# Patient Record
Sex: FEMALE | Race: WHITE | NOT HISPANIC OR LATINO | Employment: OTHER | URBAN - METROPOLITAN AREA
[De-identification: names, ages, dates, MRNs, and addresses within clinical notes are randomized per-mention and may not be internally consistent; named-entity substitution may affect disease eponyms.]

---

## 2018-11-26 ENCOUNTER — OFFICE VISIT (OUTPATIENT)
Dept: INTERNAL MEDICINE | Facility: MEDICAL CENTER | Age: 83
End: 2018-11-26
Payer: MEDICARE

## 2018-11-26 VITALS
WEIGHT: 94 LBS | HEART RATE: 62 BPM | DIASTOLIC BLOOD PRESSURE: 89 MMHG | HEIGHT: 57 IN | TEMPERATURE: 97.8 F | BODY MASS INDEX: 20.28 KG/M2 | OXYGEN SATURATION: 93 % | SYSTOLIC BLOOD PRESSURE: 153 MMHG | RESPIRATION RATE: 16 BRPM

## 2018-11-26 DIAGNOSIS — F41.9 ANXIETY: ICD-10-CM

## 2018-11-26 DIAGNOSIS — R25.1 TREMOR: ICD-10-CM

## 2018-11-26 DIAGNOSIS — R41.3 MEMORY LOSS: ICD-10-CM

## 2018-11-26 DIAGNOSIS — R26.81 UNSTEADY GAIT: ICD-10-CM

## 2018-11-26 DIAGNOSIS — F32.A DEPRESSION, UNSPECIFIED DEPRESSION TYPE: ICD-10-CM

## 2018-11-26 DIAGNOSIS — K27.9 PEPTIC ULCER: ICD-10-CM

## 2018-11-26 DIAGNOSIS — R94.31 PROLONGED Q-T INTERVAL ON ECG: ICD-10-CM

## 2018-11-26 PROCEDURE — 99204 OFFICE O/P NEW MOD 45 MIN: CPT | Performed by: INTERNAL MEDICINE

## 2018-11-26 RX ORDER — PANTOPRAZOLE SODIUM 40 MG/1
40 FOR SUSPENSION ORAL DAILY
COMMUNITY
End: 2018-11-26 | Stop reason: SDUPTHER

## 2018-11-26 RX ORDER — ACETAMINOPHEN 325 MG/1
650 TABLET ORAL EVERY 4 HOURS PRN
COMMUNITY

## 2018-11-26 RX ORDER — PANTOPRAZOLE SODIUM 40 MG/1
40 FOR SUSPENSION ORAL DAILY
Qty: 90 EACH | Refills: 3 | Status: SHIPPED | OUTPATIENT
Start: 2018-11-26

## 2018-11-26 RX ORDER — DOCUSATE SODIUM 100 MG/1
100 CAPSULE, LIQUID FILLED ORAL DAILY
COMMUNITY

## 2018-11-26 RX ORDER — CITALOPRAM HYDROBROMIDE 10 MG/1
10 TABLET ORAL DAILY
COMMUNITY
End: 2018-12-06 | Stop reason: SDUPTHER

## 2018-11-26 ASSESSMENT — PATIENT HEALTH QUESTIONNAIRE - PHQ9
CLINICAL INTERPRETATION OF PHQ2 SCORE: 3
5. POOR APPETITE OR OVEREATING: 0 - NOT AT ALL
SUM OF ALL RESPONSES TO PHQ QUESTIONS 1-9: 11

## 2018-11-26 ASSESSMENT — PAIN SCALES - GENERAL: PAINLEVEL: 2=MINIMAL-SLIGHT

## 2018-11-26 NOTE — PROGRESS NOTES
"      Established Patient    Cathy presents today with the following:    CC: ***    HPI: ***    Patient Active Problem List    Diagnosis Date Noted   • Peptic ulcer 11/26/2018   • Memory loss 11/26/2018   • Anxiety 11/26/2018   • Depression 11/26/2018   • Tremor 11/26/2018       Current Outpatient Prescriptions   Medication Sig Dispense Refill   • acetaminophen (TYLENOL) 325 MG Tab Take 650 mg by mouth every four hours as needed.     • Multiple Vitamins-Iron (MULTIVITAMIN/IRON PO) Take  by mouth.     • docusate sodium (STOOL SOFTENER) 100 MG Cap Take 100 mg by mouth 2 times a day.     • citalopram (CELEXA) 10 MG tablet Take 10 mg by mouth every day.     • pantoprazole (PROTONIX) 40 MG Pack 40 mg by Nasogastric route every day.       No current facility-administered medications for this visit.        ROS: As per HPI. Additional pertinent symptoms as noted below.    {ROS List:[id number]}    /89 (BP Location: Right arm, Patient Position: Sitting, BP Cuff Size: Adult)   Pulse 62   Temp 36.6 °C (97.8 °F) (Temporal)   Resp 16   Ht 1.448 m (4' 9\")   Wt 42.6 kg (94 lb)   SpO2 93%   Breastfeeding? No   BMI 20.34 kg/m²     Physical Exam   Constitutional:  oriented to person, place, and time. No distress.   Eyes: Pupils are equal, round, and reactive to light. No scleral icterus.  Neck: Neck supple. No thyromegaly present.   Cardiovascular: Normal rate, regular rhythm and normal heart sounds.  Exam reveals no gallop and no friction rub.  No murmur heard.  Pulmonary/Chest: Breath sounds normal. Chest wall is not dull to percussion.   Musculoskeletal:   no edema.   Lymphadenopathy: no cervical adenopathy  Neurological: alert and oriented to person, place, and time.   Skin: No cyanosis. Nails show no clubbing.  Other: ***    Note: I have reviewed all pertinent labs and diagnostic tests associated with this visit with specific comments listed under the assessment and plan below    Assessment and Plan    1. Peptic " ulcer  ***    2. Memory loss  ***    3. Anxiety  ***    4. Depression, unspecified depression type  ***    5. Tremor  ***    No recent , she has mild unsteady gait.   Get up and go: she could get up with out using her hands but she was walking unsteady. Will refer to PT.   Depression on Citalopram, old EKG on 9/21/18 from out side shows QT of 508. Will repeat EKG today. PHQ 9 is 11. No SI.  MINI cog is normal.  Daughter helps with bathing.  She is dependent in IADL like shopping , cooking.   She declines mammogram.  Colonoscopy 20 years ago.she declines another one.  She received her Flu shot     Followup: No Follow-up on file.      Signed by: Eulalia Tinoco M.D.

## 2018-11-26 NOTE — PROGRESS NOTES
New Patient to Establish    Reason to establish: New patient to establish    CC: patient is her today to get established    HPI: 83 year old lady with PMH of Peptic ulcer disease, tremor, depression, anxiety is here to get established.    Peptic ulcer disease with GI bleeding in August 2018. She is on pantoprazole and states that she has no abdominal pian, however once ximena while had dark brown ? Stool.    Depression and anxiety on Celexa, no SI, no HI.    Memory loss, she was told years ago that she has Alzheimer's disease.     Tremor, in hands which started around August 2018.    Patient Active Problem List    Diagnosis Date Noted   • Peptic ulcer 11/26/2018   • Memory loss 11/26/2018   • Anxiety 11/26/2018   • Depression 11/26/2018   • Tremor 11/26/2018       Past Medical History:   Diagnosis Date   • Anxiety    • Depression    • Memory deficit    • Peptic ulcer    • Tremor        Current Outpatient Prescriptions   Medication Sig Dispense Refill   • acetaminophen (TYLENOL) 325 MG Tab Take 650 mg by mouth every four hours as needed.     • Multiple Vitamins-Iron (MULTIVITAMIN/IRON PO) Take  by mouth.     • docusate sodium (STOOL SOFTENER) 100 MG Cap Take 100 mg by mouth 2 times a day.     • citalopram (CELEXA) 10 MG tablet Take 10 mg by mouth every day.     • pantoprazole (PROTONIX) 40 MG Pack 40 mg by Nasogastric route every day. 90 Each 3     No current facility-administered medications for this visit.        Allergies as of 11/26/2018   • (No Known Allergies)       Social History     Social History   • Marital status: Single     Spouse name: N/A   • Number of children: N/A   • Years of education: N/A     Occupational History   • Not on file.     Social History Main Topics   • Smoking status: Never Smoker   • Smokeless tobacco: Never Used   • Alcohol use No      Comment: periodically   • Drug use: No   • Sexual activity: Not on file     Other Topics Concern   • Not on file     Social History Narrative   • No  "narrative on file       Family History   Problem Relation Age of Onset   • Heart Disease Mother    • Alzheimer's Disease Father    • Heart Disease Father    • Heart Disease Brother        Past Surgical History:   Procedure Laterality Date   • APPENDECTOMY     • CHOLECYSTECTOMY     • HYSTERECTOMY, TOTAL ABDOMINAL         ROS: As per HPI. Additional pertinent symptoms as noted below.    Constitutional: no fever,no chills, no weight gain or loss.  Eyes: no blured vision, no floaters  ENT: no sore throat, no ear pain  Cardiovascular: no CP, no palpitation  Respiratory: no SOB, no cough  GI: no abdominal pain, has chronci constipation and intermittent dark brown stools.  : no dysuria, no freqency  Musculo-skeletal: no myalgia, has some back pain with walking,   Neurological: no numbness or weakness.has tremor over both hands    /89 (BP Location: Right arm, Patient Position: Sitting, BP Cuff Size: Adult)   Pulse 62   Temp 36.6 °C (97.8 °F) (Temporal)   Resp 16   Ht 1.448 m (4' 9\")   Wt 42.6 kg (94 lb)   SpO2 93%   Breastfeeding? No   BMI 20.34 kg/m²     Physical Exam  General:  Alert and oriented, No apparent distress.    Eyes: Pupils equal and reactive. No scleral icterus.    Throat: Clear no erythema or exudates noted.    Neck: Supple. No lymphadenopathy noted. Thyroid not enlarged.    Lungs: Clear to auscultation and percussion bilaterally.    Cardiovascular: Regular rate and rhythm. No murmurs, rubs or gallops.    Abdomen:  Benign. No rebound or guarding noted.    Extremities: No clubbing, cyanosis, edema.    Skin: Clear. No rash or suspicious skin lesions noted.        No te: I have reviewed all pertinent labs and diagnostic tests associated with this visit with specific comments listed under the assessment and plan below    Assessment and Plan    1. Peptic ulcer  with GI bleeding in August 2018. She is on pantoprazole and states that she has no abdominal pian, however once ximena while had dark brown ? " Stool.    Will continue pantoprazole. Ordered FOBT and asked her to turn in stool samples when they are dark brown.    2. Memory loss  MINI COG was normal (she recalled 3/3 words) , I assured her that she does not have dementia.    3. Anxiety  4. Depression, unspecified depression type  She is on Celxa, PHQ9 is 11. EKG was done and showed Qt of 470. She has had QT intervals up to 508 on 9/21/18 (out side records). I discussed it with her and her daughter that continuation of Celexa can cause irregular heart rhythm that can be life threatening. However they chose to continue Celexa and accept all the risks.     5. Tremor  Will check TSH.    6. Unsteady gait  Referred to PT.    7. Prolonged Q-T interval on ECG  Please look at #4 for more details.    EKG also shows possible J point elevation over anterior leads and some T wave in versions over other leads, However comparing to an EKG from 2015 from her old records showed that there is not much changes, She and her daughter declined any further cardiac work up and declined to go to ER. (She has no chest pains).    8- Geriatrics assessment and health maintenance.    No recent falls. Get up and go: she could get up without using her hands but she was walking unsteady. Will refer to PT.   MINI cog is normal.  Daughter helps with bathing.she is independent in other ADLs.  She is dependent in IADL like shopping , cooking.   She declines mammogram.  Colonoscopy 20 years ago.she declines another one.  She received her Flu shot   She will bring a copy of her vaccination records for us next time.    CBC, CMP. TSH, FOBT ordered.    Followup: Return in about 1 month (around 12/26/2018).      Signed by: Eulalia Tinoco M.D.

## 2018-12-05 ENCOUNTER — TELEPHONE (OUTPATIENT)
Dept: INTERNAL MEDICINE | Facility: MEDICAL CENTER | Age: 83
End: 2018-12-05

## 2018-12-05 DIAGNOSIS — F32.A DEPRESSION, UNSPECIFIED DEPRESSION TYPE: ICD-10-CM

## 2018-12-06 ENCOUNTER — TELEPHONE (OUTPATIENT)
Dept: INTERNAL MEDICINE | Facility: MEDICAL CENTER | Age: 83
End: 2018-12-06

## 2018-12-06 RX ORDER — CITALOPRAM HYDROBROMIDE 10 MG/1
10 TABLET ORAL DAILY
Qty: 30 TAB | Refills: 3 | Status: SHIPPED | OUTPATIENT
Start: 2018-12-06 | End: 2019-03-31 | Stop reason: SDUPTHER

## 2018-12-06 NOTE — TELEPHONE ENCOUNTER
VOICEMAIL  1. Caller Name: Sarah                      Call Back Number: [phone]    2. Message: Sarah called stating in last visit there were two scripts that were asked to be refilled but only the pantoprazole was refilled. She would like to know if she could get a refill on her mother's hydrobromide.    3. Patient approves office to leave a detailed voicemail/MyChart message: N\A

## 2018-12-06 NOTE — TELEPHONE ENCOUNTER
I spoke with her daughter and she stated that her mom needs a renewal of  citalopram hydrobromide (Celexa). She again confirmed that she and her mom would like to accept the cardiac risks and would like to continue Celexa.

## 2018-12-11 ENCOUNTER — HOSPITAL ENCOUNTER (OUTPATIENT)
Dept: LAB | Facility: MEDICAL CENTER | Age: 83
End: 2018-12-11
Attending: INTERNAL MEDICINE
Payer: MEDICARE

## 2018-12-11 DIAGNOSIS — R25.1 TREMOR: ICD-10-CM

## 2018-12-11 DIAGNOSIS — K27.9 PEPTIC ULCER: ICD-10-CM

## 2018-12-11 LAB
ALBUMIN SERPL BCP-MCNC: 4.3 G/DL (ref 3.2–4.9)
ALBUMIN/GLOB SERPL: 1.4 G/DL
ALP SERPL-CCNC: 55 U/L (ref 30–99)
ALT SERPL-CCNC: 25 U/L (ref 2–50)
ANION GAP SERPL CALC-SCNC: 9 MMOL/L (ref 0–11.9)
ANISOCYTOSIS BLD QL SMEAR: ABNORMAL
AST SERPL-CCNC: 29 U/L (ref 12–45)
BASOPHILS # BLD AUTO: 0.5 % (ref 0–1.8)
BASOPHILS # BLD: 0.04 K/UL (ref 0–0.12)
BILIRUB SERPL-MCNC: 0.3 MG/DL (ref 0.1–1.5)
BUN SERPL-MCNC: 18 MG/DL (ref 8–22)
CALCIUM SERPL-MCNC: 9.7 MG/DL (ref 8.5–10.5)
CHLORIDE SERPL-SCNC: 103 MMOL/L (ref 96–112)
CO2 SERPL-SCNC: 27 MMOL/L (ref 20–33)
COMMENT 1642: NORMAL
CREAT SERPL-MCNC: 0.87 MG/DL (ref 0.5–1.4)
EOSINOPHIL # BLD AUTO: 0.15 K/UL (ref 0–0.51)
EOSINOPHIL NFR BLD: 1.9 % (ref 0–6.9)
ERYTHROCYTE [DISTWIDTH] IN BLOOD BY AUTOMATED COUNT: 68.1 FL (ref 35.9–50)
GLOBULIN SER CALC-MCNC: 3.1 G/DL (ref 1.9–3.5)
GLUCOSE SERPL-MCNC: 66 MG/DL (ref 65–99)
HCT VFR BLD AUTO: 40.3 % (ref 37–47)
HGB BLD-MCNC: 13.2 G/DL (ref 12–16)
IMM GRANULOCYTES # BLD AUTO: 0.01 K/UL (ref 0–0.11)
IMM GRANULOCYTES NFR BLD AUTO: 0.1 % (ref 0–0.9)
LYMPHOCYTES # BLD AUTO: 2.62 K/UL (ref 1–4.8)
LYMPHOCYTES NFR BLD: 34 % (ref 22–41)
MACROCYTES BLD QL SMEAR: ABNORMAL
MCH RBC QN AUTO: 33.4 PG (ref 27–33)
MCHC RBC AUTO-ENTMCNC: 32.8 G/DL (ref 33.6–35)
MCV RBC AUTO: 102 FL (ref 81.4–97.8)
MONOCYTES # BLD AUTO: 0.57 K/UL (ref 0–0.85)
MONOCYTES NFR BLD AUTO: 7.4 % (ref 0–13.4)
MORPHOLOGY BLD-IMP: NORMAL
NEUTROPHILS # BLD AUTO: 4.32 K/UL (ref 2–7.15)
NEUTROPHILS NFR BLD: 56.1 % (ref 44–72)
NRBC # BLD AUTO: 0 K/UL
NRBC BLD-RTO: 0 /100 WBC
OVALOCYTES BLD QL SMEAR: NORMAL
PLATELET # BLD AUTO: 280 K/UL (ref 164–446)
PLATELET BLD QL SMEAR: NORMAL
PMV BLD AUTO: 9.3 FL (ref 9–12.9)
POIKILOCYTOSIS BLD QL SMEAR: NORMAL
POLYCHROMASIA BLD QL SMEAR: NORMAL
POTASSIUM SERPL-SCNC: 3.8 MMOL/L (ref 3.6–5.5)
PROT SERPL-MCNC: 7.4 G/DL (ref 6–8.2)
RBC # BLD AUTO: 3.95 M/UL (ref 4.2–5.4)
RBC BLD AUTO: PRESENT
SODIUM SERPL-SCNC: 139 MMOL/L (ref 135–145)
TSH SERPL DL<=0.005 MIU/L-ACNC: 4.2 UIU/ML (ref 0.38–5.33)
WBC # BLD AUTO: 7.7 K/UL (ref 4.8–10.8)

## 2018-12-11 PROCEDURE — 36415 COLL VENOUS BLD VENIPUNCTURE: CPT

## 2018-12-11 PROCEDURE — 80053 COMPREHEN METABOLIC PANEL: CPT

## 2018-12-11 PROCEDURE — 84443 ASSAY THYROID STIM HORMONE: CPT

## 2018-12-11 PROCEDURE — 85025 COMPLETE CBC W/AUTO DIFF WBC: CPT

## 2018-12-21 ENCOUNTER — OFFICE VISIT (OUTPATIENT)
Dept: INTERNAL MEDICINE | Facility: MEDICAL CENTER | Age: 83
End: 2018-12-21
Payer: MEDICARE

## 2018-12-21 VITALS
TEMPERATURE: 97.2 F | DIASTOLIC BLOOD PRESSURE: 68 MMHG | WEIGHT: 95 LBS | HEART RATE: 71 BPM | OXYGEN SATURATION: 97 % | HEIGHT: 57 IN | SYSTOLIC BLOOD PRESSURE: 128 MMHG | BODY MASS INDEX: 20.49 KG/M2

## 2018-12-21 DIAGNOSIS — R25.1 TREMOR: ICD-10-CM

## 2018-12-21 DIAGNOSIS — G89.29 CHRONIC BILATERAL LOW BACK PAIN WITHOUT SCIATICA: ICD-10-CM

## 2018-12-21 DIAGNOSIS — F41.9 ANXIETY: ICD-10-CM

## 2018-12-21 DIAGNOSIS — F32.A DEPRESSION, UNSPECIFIED DEPRESSION TYPE: ICD-10-CM

## 2018-12-21 DIAGNOSIS — K27.9 PEPTIC ULCER: ICD-10-CM

## 2018-12-21 DIAGNOSIS — M54.50 CHRONIC BILATERAL LOW BACK PAIN WITHOUT SCIATICA: ICD-10-CM

## 2018-12-21 PROCEDURE — 99214 OFFICE O/P EST MOD 30 MIN: CPT | Performed by: INTERNAL MEDICINE

## 2018-12-21 ASSESSMENT — PAIN SCALES - GENERAL: PAINLEVEL: 6=MODERATE PAIN

## 2018-12-21 NOTE — PROGRESS NOTES
Established Patient    Cathy presents today with the following:    CC: follow up on her medical probelms    HPI: 83 year old lady who moved to [place] from [place] to live with her daughter is here to day for follow up on her medical problems.  Chronic low back pain, worsening for the last 6 months. No numbness no weakness, no B/B incontinence.  Tremor of both hands at least for 1 year, no changes, she dose not recall if any one else had tremor in her family.   Depression, on Celexa. She complines that moving to [place] has been hard for hard for her since she has lived in [place] for all her life. She is considering moving back to [place] and living by herself.   Hx of GI bleed, no black stool. She still has dark brown stool, however not able to do occult blood test yet. She still strongly declines to have another colonoscopy or be referred to GI.    Patient Active Problem List    Diagnosis Date Noted   • Peptic ulcer 11/26/2018   • Memory loss 11/26/2018   • Anxiety 11/26/2018   • Depression 11/26/2018   • Tremor 11/26/2018       Current Outpatient Prescriptions   Medication Sig Dispense Refill   • citalopram (CELEXA) 10 MG tablet Take 1 Tab by mouth every day. 30 Tab 3   • acetaminophen (TYLENOL) 325 MG Tab Take 650 mg by mouth every four hours as needed.     • Multiple Vitamins-Iron (MULTIVITAMIN/IRON PO) Take  by mouth.     • docusate sodium (STOOL SOFTENER) 100 MG Cap Take 100 mg by mouth 2 times a day.     • pantoprazole (PROTONIX) 40 MG Pack 40 mg by Nasogastric route every day. 90 Each 3     No current facility-administered medications for this visit.        ROS: As per HPI. Additional pertinent symptoms as noted below.    Constitutional: no fever/chills. no weight gain or loss  Eyes: no blured vision, no floaters  ENT: no sore throat, no ear pain   Cardiovascular: no CP, no palpitation  Respiratory: no SOB. no cough  GI: no abdominal pain, has intermittent constipation with no changes. no diarrhea  : no  "dysuria, no frequency  Musculo-skeletal: has low back pain, with no neurological symptoms, no siatica    /68 (BP Location: Right arm, Patient Position: Sitting, BP Cuff Size: Adult)   Pulse 71   Temp 36.2 °C (97.2 °F) (Temporal)   Ht 1.448 m (4' 9\")   Wt 43.1 kg (95 lb)   SpO2 97%   Breastfeeding? No   BMI 20.56 kg/m²     Physical Exam   Constitutional:  oriented to person, place, and time. No distress.   Eyes: Pupils are equal, round, and reactive to light. No scleral icterus.  Neck: Neck supple. No thyromegaly present.   Cardiovascular: Normal rate, regular rhythm and normal heart sounds.  Exam reveals no gallop and no friction rub.  No murmur heard.  Pulmonary/Chest: Breath sounds normal. Chest wall is not dull to percussion.   Musculoskeletal:   no edema.   Lymphadenopathy: no cervical adenopathy  Neurological: alert and oriented to person, place, and time.   Skin: No cyanosis. Nails show no clubbing.      Note: I have reviewed all pertinent labs and diagnostic tests associated with this visit with specific comments listed under the assessment and plan below  Results for JIMMY YUEN (MRN [id number]) as of 12/21/2018 10:52   Ref. Range 12/11/2018 09:20   WBC Latest Ref Range: 4.8 - 10.8 K/uL 7.7   RBC Latest Ref Range: 4.20 - 5.40 M/uL 3.95 (L)   Hemoglobin Latest Ref Range: 12.0 - 16.0 g/dL 13.2   Hematocrit Latest Ref Range: 37.0 - 47.0 % 40.3   MCV Latest Ref Range: 81.4 - 97.8 fL 102.0 (H)   MCH Latest Ref Range: 27.0 - 33.0 pg 33.4 (H)   MCHC Latest Ref Range: 33.6 - 35.0 g/dL 32.8 (L)   RDW Latest Ref Range: 35.9 - 50.0 fL 68.1 (H)   Platelet Count Latest Ref Range: 164 - 446 K/uL 280   MPV Latest Ref Range: 9.0 - 12.9 fL 9.3     Results for JIMMY YUEN (MRN [id number]) as of 12/21/2018 10:52   Ref. Range 12/11/2018 09:21   Sodium Latest Ref Range: 135 - 145 mmol/L 139   Potassium Latest Ref Range: 3.6 - 5.5 mmol/L 3.8   Chloride Latest Ref Range: 96 - 112 mmol/L 103   Co2 Latest Ref Range: 20 " - 33 mmol/L 27   Anion Gap Latest Ref Range: 0.0 - 11.9  9.0   Glucose Latest Ref Range: 65 - 99 mg/dL 66   Bun Latest Ref Range: 8 - 22 mg/dL 18   Creatinine Latest Ref Range: 0.50 - 1.40 mg/dL 0.87   GFR If  Latest Ref Range: >60 mL/min/1.73 m 2 >60   GFR If Non  Latest Ref Range: >60 mL/min/1.73 m 2 >60   Calcium Latest Ref Range: 8.5 - 10.5 mg/dL 9.7   AST(SGOT) Latest Ref Range: 12 - 45 U/L 29   ALT(SGPT) Latest Ref Range: 2 - 50 U/L 25   Alkaline Phosphatase Latest Ref Range: 30 - 99 U/L 55   Total Bilirubin Latest Ref Range: 0.1 - 1.5 mg/dL 0.3   Albumin Latest Ref Range: 3.2 - 4.9 g/dL 4.3   Total Protein Latest Ref Range: 6.0 - 8.2 g/dL 7.4   Globulin Latest Ref Range: 1.9 - 3.5 g/dL 3.1   A-G Ratio Latest Units: g/dL 1.4     Assessment and Plan    1. Depression, unspecified depression type  2. Anxiety  She on Celexa. Please look at my note dated 11/26/18. She decided to continue her celexa despite  having prolonged QT and accepted possible life threatening consequences. She complines that moving to [place] has been hard for hard for her (she has lived in [place] for all her life). She is considering moving back to [place] and living by herself.   Patient agrees to be seen by psychology.    3. Tremor  No changes. She declines referral to neurology. She is no interest in being started on any medications. TSH was normal.    4. Peptic ulcer  She has no abdominal pain, she is on Pantoprazole, no black stools, has not turned in the occult blood test.  She declines referral to GI. Last coloscopy was 20 years ago.    5- Low back pain  No neurological symptoms,she declines any imaging she is going to start PT soon.    Followup: 3 months.      Signed by: Eulalia Tinoco M.D.

## 2018-12-24 ENCOUNTER — APPOINTMENT (OUTPATIENT)
Dept: PHYSICAL THERAPY | Facility: REHABILITATION | Age: 83
End: 2018-12-24
Payer: MEDICARE

## 2018-12-26 ENCOUNTER — APPOINTMENT (OUTPATIENT)
Dept: PHYSICAL THERAPY | Facility: REHABILITATION | Age: 83
End: 2018-12-26
Payer: MEDICARE

## 2018-12-31 ENCOUNTER — APPOINTMENT (OUTPATIENT)
Dept: PHYSICAL THERAPY | Facility: REHABILITATION | Age: 83
End: 2018-12-31
Payer: MEDICARE

## 2019-01-02 ENCOUNTER — APPOINTMENT (OUTPATIENT)
Dept: PHYSICAL THERAPY | Facility: REHABILITATION | Age: 84
End: 2019-01-02
Payer: MEDICARE

## 2019-01-07 ENCOUNTER — APPOINTMENT (OUTPATIENT)
Dept: PHYSICAL THERAPY | Facility: REHABILITATION | Age: 84
End: 2019-01-07
Payer: MEDICARE

## 2019-01-07 ENCOUNTER — PHYSICAL THERAPY (OUTPATIENT)
Dept: PHYSICAL THERAPY | Facility: REHABILITATION | Age: 84
End: 2019-01-07
Attending: INTERNAL MEDICINE
Payer: MEDICARE

## 2019-01-07 DIAGNOSIS — R26.81 UNSTEADY GAIT: ICD-10-CM

## 2019-01-07 PROCEDURE — 97162 PT EVAL MOD COMPLEX 30 MIN: CPT

## 2019-01-07 PROCEDURE — 97014 ELECTRIC STIMULATION THERAPY: CPT

## 2019-01-07 ASSESSMENT — ENCOUNTER SYMPTOMS
PAIN SCALE AT HIGHEST: 8
PAIN TIMING: CONSTANT
EXACERBATED BY: PROLONGED SITTING
PAIN SCALE AT LOWEST: 1
EXACERBATED BY: SITTING
QUALITY: STABBING
PAIN SCALE: 4
EXACERBATED BY: RAPID POSITION CHANGES

## 2019-01-07 NOTE — OP THERAPY EVALUATION
Outpatient Physical Therapy  INITIAL NEUROLOGICAL EVALUATION    [facility]  [address]  [address]  [place] [address]  Phone:  [phone]  Fax:  [phone]    Date of Evaluation: 01/07/2019    Patient: Cathy Valladares  YOB: 1935  MRN: [id number]     Referring Provider: Eulalia Tinoco M.D.  [address]  [address]  [place] [address]   Referring Diagnosis Unsteady gait [R26.81]     Time Calculation  Start time: 0800  Stop time: 0900 Time Calculation (min): 60 minutes     Physical Therapy Occurrence Codes    Date of onset of impairment:  11/26/18   Date physical therapy care plan established or reviewed:  1/7/19   Date physical therapy treatment started:  1/7/19          Chief Complaint: Back pain and impaired mobility   Visit Diagnoses     ICD-10-CM   1. Unsteady gait R26.81       Subjective:   History of Present Illness:     Mechanism of injury:  Patient is a 83 year old female who presents with reported low back pain limiting her walking. Patient was previously living with her son in [place] but was hospitalized due to ulcer. At that time, patient was recommended not to live alone and she moved out to [place] with daughter. Her daughter works full time, and patient is home alone for 4-5 hour increments.     Patient and daughter due report bilateral hand tremor that affect holding objects and fluctuates throughout the day. Daughter reports patient changes with handwriting. At this time due to family ability to take patient to all MD appointments they declined neurology consult from MD. There concerns were safety with gait and helping patient cope with move to [place]. She has been having a difficult time.     Patient previously is a avid walker and typically walks at least 3/4 of a mile.     Patient currently lives in a apartment on a ground level with no steps to enter. Patient currently sleeps in master bedroom with close bathroom access.Patient was given a SPC,  but she is currently not using it. Patient is only requesting her wooden SPC.     Denies numbness/tingling into back.  Reports in August rapid weight loss, but weight is improving.    Patient reports back pain has been ongoing approximately 2 years, with last 6 months worsening after patient hospitalization.  Sleep disturbance:  Interrupted sleep  Awakenings per night: 2-3x a night due to incontinence with reports of using the walls for support   Pain:     Current pain ratin    At best pain ratin    At worst pain ratin    Quality:  Stabbing    Pain timing:  Constant    Aggravating factors:  Prolonged sitting, sitting and rapid position changes  Diagnostic Tests:     None    Patient Goals:     Other patient goals:  To return back to walking and improve sxs.       Past Medical History:   Diagnosis Date   • Anxiety    • Depression    • Memory deficit    • Peptic ulcer    • Tremor      Past Surgical History:   Procedure Laterality Date   • APPENDECTOMY     • CHOLECYSTECTOMY     • HYSTERECTOMY, TOTAL ABDOMINAL       Social History   Substance Use Topics   • Smoking status: Never Smoker   • Smokeless tobacco: Never Used   • Alcohol use No      Comment: periodically     Family and Occupational History     Social History   • Marital status: Single     Spouse name: N/A   • Number of children: N/A   • Years of education: N/A       Objective:   Active Range of Motion:   Active range of motion comments: Lumbar flexion: 75% no sxs  Lumbar extension: 25% no sxs  Lumbar lateral flexion: 50% sxs to R side       Strength:   Lower extremity (left):     Hip flexion: 4    Knee flexion: 4    Knee extension: 4+  Lower extremity (right):     Hip flexion: 4    Knee flexion: 4    Knee extension: 4+    Tone, Sensation and Coordination:   Tone:     Left upper extremity muscle tone: Intentional tremors and Resting tremors    Right upper extremity muscle tone: Intentional tremors    Left lower extremity muscle tone: Normal     Right lower extremity muscle tone: Normal (patient is guarded throughout session )    Cognition:     Orientation: normal to situation, normal to person and normal to place    Problem solving: impaired    Cognition Comments:  Patient does report impaired cognition.     Observational Gait     Walking speed and stride length below functional limits.    Left arm swing: decreased  Right arm swing: decreased    Balance/Gait Comments   Sit to stand 30 seconds: 6x with no UE   Romberg: Eyes open 30 seconds, eyes closed 7 seconds (with reports of dizziness)   Tandem stance:  L foot forward (7 seconds), R foot forward (23 seconds)         Therapeutic Exercises (CPT 64571):     1. Prone     2. Sit to stand with diaghragm breathing     Therapeutic Treatments and Modalities:     1. E Stim Unattended (CPT 34926), IFC and MHP in supine position     Time-based treatments/modalities:          Assessment, Response and Plan:   Impairments: abnormal gait, activity intolerance, lacks appropriate home exercise program and pain with function    Assessment details:  Cathy is a pleasant 83 year old female who presents with impaired mobility, decreased strength, decreased ROM, and back pain limiting functional mobility. Patient was very anxious during evaluation and will need to be continued to be assessed with gait. The patient's tremor did fluctuate throughout treatment, more prominent in the L hand.  At this time, patient may benefit from PT services to improve safety with gait and decrease aggravating sxs.   Barriers to therapy: anxiety   Goals:   Short Term Goals:   1) Patient will improve >1x on 30 seconds sit to stand   2) Patient will trial use of AD to improve gait   3) Patient will report 50% improvement with sxs.   Short term goal time span:  2-4 weeks      Long Term Goals:    1) Patient will be independent with HEP   2) Patient will improve >50% on the WOMAC   3) Patient will report no falls   Long term goal time span:  6-8  weeks    Plan:   Therapy options:  Physical therapy treatment to continue (patient will continues to benefit from neurology consult )  Planned therapy interventions:  Neuromuscular Re-education (CPT 82493), Gait Training (CPT 87782), Therapeutic Exercise (CPT 22095), E Stim Unattended (CPT 54742) and Manual Therapy (CPT 74558)  Frequency: 1-2x a week.  Duration in weeks:  8  Duration in visits:  8  Discussed with:  Patient      Functional Limitations and Severity Modifiers  WOMAC Grand Total: 34.38       Referring provider co-signature:  I have reviewed this plan of care and my co-signature certifies the need for services.  Certification Dates:   From 1/7/2019       To 03/04/2019    Physician Signature: ________________________________ Date: ______________

## 2019-01-09 ENCOUNTER — APPOINTMENT (OUTPATIENT)
Dept: PHYSICAL THERAPY | Facility: REHABILITATION | Age: 84
End: 2019-01-09
Attending: INTERNAL MEDICINE
Payer: MEDICARE

## 2019-01-21 ENCOUNTER — PHYSICAL THERAPY (OUTPATIENT)
Dept: PHYSICAL THERAPY | Facility: REHABILITATION | Age: 84
End: 2019-01-21
Attending: INTERNAL MEDICINE
Payer: MEDICARE

## 2019-01-21 DIAGNOSIS — R26.81 UNSTEADY GAIT: ICD-10-CM

## 2019-01-21 PROCEDURE — 97140 MANUAL THERAPY 1/> REGIONS: CPT

## 2019-01-21 PROCEDURE — 97014 ELECTRIC STIMULATION THERAPY: CPT

## 2019-01-21 PROCEDURE — 97110 THERAPEUTIC EXERCISES: CPT

## 2019-01-21 NOTE — OP THERAPY DAILY TREATMENT
Outpatient Physical Therapy  DAILY TREATMENT     [facility] [address]  [address]  [place] [address]  Phone:  [phone]  Fax:  [phone]    Date: 01/21/2019    Patient: Cathy Valladares  YOB: 1935  MRN: [id number]     Time Calculation  Start time: 0805  Stop time: 0845 Time Calculation (min): 40 minutes     Chief Complaint: No chief complaint on file.    Visit #: 2    SUBJECTIVE:  Patient reports she really like the e-stim and felt better for 2 days after evaluation. Patients daughter reports difficulty tolerating prone position at home.     OBJECTIVE:          Therapeutic Exercises (CPT 45353):     1. Nustep , 4 minutes level 1     2. Bridging , x 10     3. Thoracic rotation , x 5     4. Prone , 2 minutes , difficulty tolerating position     5. Cat/camel , x 3 , patient unable to tolerate increased reps due to UE fatigue     Therapeutic Treatments and Modalities:     1. Manual Therapy (CPT 49711), trialed of lumbar traction in supine position, no change reported. Prone central PA L4-S2 grade II/III, lateral , patient report helps sxs.     2. E Stim Unattended (CPT 37541), Lumbar IFC and MHP in supine     Time-based treatments/modalities:  Manual therapy minutes (CPT 08654): 10 minutes  Therapeutic exercise minutes (CPT 26697): 15 minutes       Pain rating before treatment: 6-7  Pain rating after treatment: 3, after exercises     ASSESSMENT:   Response to treatment: Patient continues to report improvement with sxs, with activity and use of e-stim. Patient demonstrates spinal hypomobility. She will continue to benefit from exercise program for trunk and posterior chain strengthening. Tremoring noted today more with holding objects. Discussed continued recommendation to follow up with neurologist. Patient's daughter indicated they have started B12.     PLAN/RECOMMENDATIONS:   Plan for treatment: therapy treatment to continue next visit.  Planned interventions for  next visit: continue with current treatment.

## 2019-01-28 ENCOUNTER — PHYSICAL THERAPY (OUTPATIENT)
Dept: PHYSICAL THERAPY | Facility: REHABILITATION | Age: 84
End: 2019-01-28
Attending: INTERNAL MEDICINE
Payer: MEDICARE

## 2019-01-28 DIAGNOSIS — R26.81 UNSTEADY GAIT: ICD-10-CM

## 2019-01-28 PROCEDURE — 97110 THERAPEUTIC EXERCISES: CPT

## 2019-01-28 PROCEDURE — 97014 ELECTRIC STIMULATION THERAPY: CPT

## 2019-01-28 NOTE — OP THERAPY DAILY TREATMENT
Outpatient Physical Therapy  DAILY TREATMENT     [facility] [address].  [address]  [place] [address]  Phone:  [phone]  Fax:  [phone]    Date: 01/28/2019    Patient: Cathy Valladares  YOB: 1935  MRN: [id number]     Time Calculation  Start time: 0803  Stop time: 0845 Time Calculation (min): 42 minutes     Chief Complaint: low back pain   Visit #: 3    SUBJECTIVE:  Patient reports occurrences of pain are not as frequent and intense. The e-stim last visit helped patient feel good for the duration of the day after last session. She was able to go for a walk yesterday.     OBJECTIVE:          Therapeutic Exercises (CPT 07486):     1. Nustep ,  5 minutes level 1 , no UE     2. Pelvic tilt with adduction ball squeeze , x 10     3. Bridging with ball adduction , x 10       Therapeutic Exercise Summary: Spent increased time discussing neutral lumbar position and optimal positions from supine to sit to avoid increased tension on lumbar spine.     Therapeutic Treatments and Modalities:     1. Manual Therapy (CPT 61375), 4 x 30 lumbar distraction , patient report helps sxs.     2. E Stim Unattended (CPT 16735), Lumbar IFC and MHP in supine     Time-based treatments/modalities:  Therapeutic exercise minutes (CPT 61854): 30 minutes       ASSESSMENT:   Response to treatment: Patient tolerated treatment well and demonstrates difficulty with lumbar hip dissociation.     PLAN/RECOMMENDATIONS:   Plan for treatment: therapy treatment to continue next visit.  Planned interventions for next visit: continue with current treatment.

## 2019-02-04 ENCOUNTER — APPOINTMENT (OUTPATIENT)
Dept: PHYSICAL THERAPY | Facility: REHABILITATION | Age: 84
End: 2019-02-04
Attending: INTERNAL MEDICINE
Payer: MEDICARE

## 2019-02-07 ENCOUNTER — APPOINTMENT (OUTPATIENT)
Dept: PHYSICAL THERAPY | Facility: REHABILITATION | Age: 84
End: 2019-02-07
Attending: INTERNAL MEDICINE
Payer: MEDICARE

## 2019-02-07 DIAGNOSIS — R26.81 UNSTEADY GAIT: ICD-10-CM

## 2019-02-07 PROCEDURE — 97110 THERAPEUTIC EXERCISES: CPT

## 2019-02-07 PROCEDURE — 97014 ELECTRIC STIMULATION THERAPY: CPT

## 2019-02-07 NOTE — OP THERAPY DAILY TREATMENT
Outpatient Physical Therapy  DAILY TREATMENT     [facility] [place] [address] [facility]  [address]  [place] [address]  Phone:  [phone]  Fax:  [phone]    Date: 02/07/2019    Patient: Cathy Valladares  YOB: 1935  MRN: [id number]     Time Calculation  Start time: 0802  Stop time: 0845 Time Calculation (min): 43 minutes     Chief Complaint: No chief complaint on file.    Visit #: 4    SUBJECTIVE:  Patient reports the sxs are less intense and less frequent. Patient is noticing positive changes.     OBJECTIVE:            Therapeutic Exercises (CPT 25375):     1. Nustep , 5 minutes, level 1 , with UE     2. Pelvic tilt with adduction ball squeeze , x 15     3. Bridging with ball adduction , 2 x 10       Therapeutic Exercise Summary: Spent time reviewing mat to floor transfers with options to get onto/off the floor to complete exercises at home. Patient able to demonstrate independently. Discussed with patient and daughter safety if patient has a fall.     Therapeutic Treatments and Modalities:     2. E Stim Unattended (CPT 12067), Lumbar IFC and MHP in supine     Time-based treatments/modalities:  Therapeutic exercise minutes (CPT 02901): 28 minutes       Pain rating before treatment: 0  Pain rating after treatment: 0    ASSESSMENT:   Response to treatment: Patient is improving with mobility, but does demonstrate intermittent decreased balance with self recovery using no AD. Patient reports her change in pain levels is making her more confident with mobility.     PLAN/RECOMMENDATIONS:   Plan for treatment: therapy treatment to continue next visit.  Planned interventions for next visit: gait/ balance training .

## 2019-02-11 ENCOUNTER — APPOINTMENT (OUTPATIENT)
Dept: PHYSICAL THERAPY | Facility: REHABILITATION | Age: 84
End: 2019-02-11
Attending: INTERNAL MEDICINE
Payer: MEDICARE

## 2019-02-13 ENCOUNTER — TELEPHONE (OUTPATIENT)
Dept: INTERNAL MEDICINE | Facility: MEDICAL CENTER | Age: 84
End: 2019-02-13

## 2019-02-13 NOTE — TELEPHONE ENCOUNTER
Katherin pt daughter called stating pt needs a referral for [facility] and would like to see if doctor could possibly give it to her and have it faxed to [phone].

## 2019-02-14 DIAGNOSIS — Z60.0 PROBLEMS OF ADJUSTMENT TO LIFE-CYCLE TRANSITIONS: ICD-10-CM

## 2019-02-14 SDOH — SOCIAL STABILITY - SOCIAL INSECURITY: PROBLEMS OF ADJUSTMENT TO LIFE-CYCLE TRANSITIONS: Z60.0

## 2019-02-14 NOTE — PROGRESS NOTES
Called and notified pt daughter Filiberto Mauricio and informed her about this, she was very grateful for this and I informed her that if they do not get to them to schedule a visit to please give us a call and we will help her schedule one.

## 2019-03-26 ENCOUNTER — APPOINTMENT (OUTPATIENT)
Dept: INTERNAL MEDICINE | Facility: MEDICAL CENTER | Age: 84
End: 2019-03-26
Payer: MEDICARE

## 2019-03-26 ENCOUNTER — TELEPHONE (OUTPATIENT)
Dept: INTERNAL MEDICINE | Facility: MEDICAL CENTER | Age: 84
End: 2019-03-26

## 2019-03-26 NOTE — TELEPHONE ENCOUNTER
I called and spoke with her daughter, She is worry about her mom's memory and states that she becomes angary if they talk about the things that she forgot. Will check her memory today.

## 2019-03-26 NOTE — TELEPHONE ENCOUNTER
Pt daughter Ayana called asking if we could reschedule her appointment due to them not being able to make it in to clinic on time, she also had a question about pts referrals, I have tried calling pt dtr and she did not answer, I LM asking her to please give us a call back regarding her referral and also notified her that our girls upfront will most likely be giving her a call back to reschedule an appointment for pt.

## 2019-03-31 DIAGNOSIS — F32.A DEPRESSION, UNSPECIFIED DEPRESSION TYPE: ICD-10-CM

## 2019-04-01 NOTE — TELEPHONE ENCOUNTER
Last seen: 12/21/18 by Dr. Tinoco  Next appt: 05/03/19 with Dr. Tinoco    Was the patient seen in the last year in this department? Yes   Does patient have an active prescription for medications requested? No   Received Request Via: Pharmacy

## 2019-04-04 RX ORDER — CITALOPRAM HYDROBROMIDE 10 MG/1
TABLET ORAL
Qty: 30 TAB | Refills: 1 | Status: SHIPPED | OUTPATIENT
Start: 2019-04-04 | End: 2019-04-05 | Stop reason: SDUPTHER

## 2019-04-05 DIAGNOSIS — F32.A DEPRESSION, UNSPECIFIED DEPRESSION TYPE: ICD-10-CM

## 2019-04-05 RX ORDER — CITALOPRAM HYDROBROMIDE 10 MG/1
10 TABLET ORAL DAILY
Qty: 30 TAB | Refills: 0 | Status: SHIPPED | OUTPATIENT
Start: 2019-04-05 | End: 2019-05-14 | Stop reason: SDUPTHER

## 2019-04-05 NOTE — TELEPHONE ENCOUNTER
PT SEEN: 12/21/18 WITH Dr. KERR NEXT APPT 5/3/19 WITH Dr. KERR  Was the patient seen in the last year in this department? Yes     Does patient have an active prescription for medications requested? No     Received Request Via: Pharmacy

## 2019-04-15 ENCOUNTER — TELEPHONE (OUTPATIENT)
Dept: PHYSICAL THERAPY | Facility: REHABILITATION | Age: 84
End: 2019-04-15

## 2019-04-15 NOTE — OP THERAPY DISCHARGE SUMMARY
Outpatient Physical Therapy  DISCHARGE SUMMARY NOTE      [facility] Therapy [facility] [address]  [address]  [address]  [place] [address]  Phone:  [phone]  Fax:  [phone]    Date of Visit: 04/15/2019    Patient: Cathy Valladares  YOB: 1935  MRN: [id number]     Referring Provider: Eulalia Tinoco M.D.  [address]  [address]  [place] [address]   Referring Diagnosis Unsteady gait [R26.81]     Physical Therapy Occurrence Codes    Date of onset of impairment:  11/26/18   Date physical therapy care plan established or reviewed:  1/7/19   Date physical therapy treatment started:  1/7/19          Functional Limitations and Severity Modifiers          Your patient is being discharged from Physical Therapy with the following comments:   · Patient has failed to schedule or reschedule follow-up visits    Comments:  Patient was seen for 4 PT appointments from 01/07/2019 to 02/07/2019 for PT with emphasis on decreasing back pain and improving balance. Patient was reporting gains with mobility. However, patient did not return for follow up.     Limitations Remaining:  Unknown at this time.     Recommendations:  D/c from PT services, not seen in > 30 days. Discussed with patient and daughter previously pursuing neurology follow up.     Frances Luna, PT, DPT    Date: 4/15/2019

## 2019-05-14 ENCOUNTER — OFFICE VISIT (OUTPATIENT)
Dept: INTERNAL MEDICINE | Facility: MEDICAL CENTER | Age: 84
End: 2019-05-14
Payer: MEDICARE

## 2019-05-14 VITALS
DIASTOLIC BLOOD PRESSURE: 72 MMHG | BODY MASS INDEX: 22.92 KG/M2 | HEART RATE: 68 BPM | SYSTOLIC BLOOD PRESSURE: 130 MMHG | WEIGHT: 106.25 LBS | OXYGEN SATURATION: 96 % | HEIGHT: 57 IN | TEMPERATURE: 98 F

## 2019-05-14 DIAGNOSIS — R53.83 OTHER FATIGUE: ICD-10-CM

## 2019-05-14 DIAGNOSIS — R25.1 TREMOR: ICD-10-CM

## 2019-05-14 DIAGNOSIS — F32.A DEPRESSION, UNSPECIFIED DEPRESSION TYPE: ICD-10-CM

## 2019-05-14 DIAGNOSIS — R41.3 MEMORY LOSS: ICD-10-CM

## 2019-05-14 DIAGNOSIS — H91.93 BILATERAL HEARING LOSS, UNSPECIFIED HEARING LOSS TYPE: ICD-10-CM

## 2019-05-14 PROCEDURE — 99213 OFFICE O/P EST LOW 20 MIN: CPT | Mod: GC | Performed by: INTERNAL MEDICINE

## 2019-05-14 RX ORDER — CITALOPRAM HYDROBROMIDE 10 MG/1
10 TABLET ORAL DAILY
Qty: 30 TAB | Refills: 1 | Status: SHIPPED | OUTPATIENT
Start: 2019-05-14 | End: 2019-06-03 | Stop reason: SDUPTHER

## 2019-05-14 NOTE — PATIENT INSTRUCTIONS
1. Please have the blood tests done as requested   2. You have been referred to neurologist for tremor  3. You have been referred to the audiologist for hearing test  4. Follow up with Dr Tinoco in 1 monthvvvvvvvv

## 2019-05-14 NOTE — PROGRESS NOTES
Follow-up    Chief Complaint   Patient presents with   • Medication Management     Follow up on pt's medications   • Patient Question     Re: RADHA's        HPI   Patient returns for follow-up for management of depression.  She is accompanied by her daughter with whom she has been living since she moved from [place] in October last year .  Patient and the daughter agree that she has been  more settled since her last appointment.  Patient's daughter has noticed improvement in her mood and in sense of anger and frustration since she was commenced on citalopram.  Patient reports that her mood is still low on occasions with ups and downs.She has been eating regular 3 healthy meals a day prepared by her daughter.  Patient reports that she enjoys walking around the apartment.  She still has difficulty adjusting in [place].  She misses her brother who lives near [place], her friends and the local [employer].  Her move to [place] was prompted by a hospital admission for peptic ulcer disease requiring surgery.  Her daughter reports that patient had difficulty managing her daily affairs along with noticeable decline in cognition.  She had issues with hoarding and bug infestation in her apartment.  Patient has had difficulty with her memory in the last couple of years.  She forgets things such as children's names, birthdays and favorite foods.  She is currently living with her daughter, son-in-law and 2 grandchildren.  She suffers from low back pain worsening since her last visit.  She has history of GI bleed but denies having any current symptoms such as rectal bleeding or melena.  She is aware of increased risk of GI bleed with citalopram and wishes to continue given significant improvement in her mood with this medication.        Past Medical History:   Diagnosis Date   • Anxiety    • Depression    • Memory deficit    • Peptic ulcer    • Tremor        Past Surgical History:   Procedure Laterality Date   • APPENDECTOMY     •  CHOLECYSTECTOMY     • HYSTERECTOMY, TOTAL ABDOMINAL         Current Outpatient Prescriptions   Medication Sig Dispense Refill   • Cyanocobalamin (VITAMIN B-12 PO) Take  by mouth every day.     • citalopram (CELEXA) 10 MG tablet Take 1 Tab by mouth every day. 30 Tab 0   • acetaminophen (TYLENOL) 325 MG Tab Take 650 mg by mouth every four hours as needed.     • Multiple Vitamins-Iron (MULTIVITAMIN/IRON PO) Take  by mouth every day.     • docusate sodium (STOOL SOFTENER) 100 MG Cap Take 100 mg by mouth every day.     • pantoprazole (PROTONIX) 40 MG Pack 40 mg by Nasogastric route every day. 90 Each 3     No current facility-administered medications for this visit.        Allergies as of 05/14/2019   • (No Known Allergies)        Social History     Social History   • Marital status: Single     Spouse name: N/A   • Number of children: N/A   • Years of education: N/A     Occupational History   • Not on file.     Social History Main Topics   • Smoking status: Never Smoker   • Smokeless tobacco: Never Used   • Alcohol use No      Comment: periodically   • Drug use: No   • Sexual activity: Not on file     Other Topics Concern   • Not on file     Social History Narrative   • No narrative on file       Family History   Problem Relation Age of Onset   • Heart Disease Mother    • Alzheimer's Disease Father    • Heart Disease Father    • Heart Disease Brother        ROS: As per HPI. Additional pertinent symptoms as noted below.  -Constitutional: No fever/chills.  She has gained 5 kg in weight since her last appointment with  in December 2018  -Eyes: No blurred vision, no diplopia.  ENT: No sore throat, no ear pain  Cardiovascular: No chest pain, no palpitations.  Respiratory: No shortness of breath, no cough.  GI: No abdominal pain, no changes in bowel habit.  : No dysuria, no increased frequency.  Musculoskeletal: Positive for lower back pain with no neurological symptoms.  Neurological: Positive for tremors.  No  "headache or limb weakness.      /72 (BP Location: Left arm, Patient Position: Sitting, BP Cuff Size: Adult)   Pulse 68   Temp 36.7 °C (98 °F) (Temporal)   Ht 1.448 m (4' 9\")   Wt 48.2 kg (106 lb 4 oz)   SpO2 96%   BMI 22.99 kg/m²     Physical Exam  General:  Alert and oriented.  No apparent distress.    Eyes: No scleral icterus. No conjunctival injection.      ENMT: Normal ear examination with no impacted wax.  Moist mucous membranes.  Throat normal with no signs of inflammation.    Neck: Neck supple.  Trachea midline.      Resp: Clear to auscultation bilaterally. No rales, rhonchi, or wheezes.    Cardiovascular: Regular rate and normal rhythm. No murmurs, rubs or gallops. 2+ radial pulses.     Abdomen: Soft, nontender, nondistended. Positive bowel sounds.     Musculoskeletal: No clubbing, cyanosis, edema.    Skin: Clear. No rash noted.    Lymph: No palpable cervical lymphadenopathy    Neuro: Cranial nerves II through XII intact.  Gait normal without use of assistive device    Psych: Mood appears euthymic. Affect congruent.      Labs/Studies-requested    Assessment and Plan  1. Depression, unspecified depression type  -Patient and the daughter agree that she has been  more settled since her last appointment.  Patient's daughter has noticed improvement in her mood and in sense of anger and frustration since she was commenced on citalopram.  Patient reports that her mood is still low on occasions with ups and downs.She has been eating regular, 3 healthy meals a day prepared by her daughter.  Patient reports that she enjoys walking around the apartment.  She still has difficulty adjusting in [place].  She misses her brother who lives near [place], her friends and the local [employer].    -She is considering moving back to live in an assisted living facility in [place] to be closer to her brother and the local [employer].  - Await psychology appointment.  - citalopram (CELEXA) 10 MG tablet; Take 1 Tab by mouth every " day.  Dispense: 30 Tab; Refill: 1  - CBC WITH DIFFERENTIAL; Future  - Comp Metabolic Panel; Future  - VIT B12,  FOLIC ACID  - MAGNESIUM; Future    2. Tremor  Patient has tremors in both hands as reported by the patient and her daughter.  Tremor seems to be more pronounced when the patient is more anxious.  There is no reported weakness in her extremities.  She agrees to see a neurologist for the tremor.  - Refer neurology for tremor at rest  - CBC WITH DIFFERENTIAL; Future  - Comp Metabolic Panel; Future  - MAGNESIUM; Future    3. Bilateral hearing loss, unspecified hearing loss type  Patient and her daughter report that she has been suffering from bilateral hearing loss for several years.  She has cognitive decline which may worsen with hearing loss.  A referral to an audiologist for hearing test is requested .  - Comp Metabolic Panell; Future  - MAGNESIUM; Future  - REFERRAL TO AUDIOLOGY    4. Memory loss  Patient was recently seen at [facility] for aging for compressive geriatric assessment.  She is believed to have significant memory loss as evident from a score of 0 at mini cog test done at [facility].    Given her memory loss, will recheck B12/folate/TSH.   She reports hearing loss in both ears which can be associated with worsening cognitive function.   - Therefore, an audiology referral was done for hearing assessment.  Repeat labs as suggested by [facility]  Consider a MOCA during the next appointment with her own PCP with consideration for neuropsych testing and MRI scan if considered appropriate.  - CBC WITH DIFFERENTIAL; Future  - Comp Metabolic Panel; Future  - VIT B12,  FOLIC ACID  - MAGNESIUM; Future    5. Other fatigue   Associated with low mood  Repeat labs, follow- up with her PCP in 1 month.  - MAGNESIUM; Future  No diagnosis found.    There are no diagnoses linked to this encounter.      There are no Patient Instructions on file for this visit.    Follow-up  No Follow-up on  file.    Signed by: Raya Diaz M.D.

## 2019-06-03 DIAGNOSIS — F32.A DEPRESSION, UNSPECIFIED DEPRESSION TYPE: ICD-10-CM

## 2019-06-03 RX ORDER — CITALOPRAM HYDROBROMIDE 10 MG/1
10 TABLET ORAL DAILY
Qty: 90 TAB | Refills: 0 | Status: SHIPPED | OUTPATIENT
Start: 2019-06-03

## 2019-06-03 NOTE — TELEPHONE ENCOUNTER
Last seen: 05/14/19 by Dr. Diaz  Next appt: None     Was the patient seen in the last year in this department? Yes   Does patient have an active prescription for medications requested? No   Received Request Via: Pharmacy

## 2019-06-05 ENCOUNTER — APPOINTMENT (OUTPATIENT)
Dept: BEHAVIORAL HEALTH | Facility: CLINIC | Age: 84
End: 2019-06-05
Payer: MEDICARE

## 2019-07-10 ENCOUNTER — OFFICE VISIT (OUTPATIENT)
Dept: URGENT CARE | Facility: PHYSICIAN GROUP | Age: 84
End: 2019-07-10
Payer: MEDICARE

## 2019-07-10 ENCOUNTER — HOSPITAL ENCOUNTER (OUTPATIENT)
Facility: MEDICAL CENTER | Age: 84
End: 2019-07-10
Attending: NURSE PRACTITIONER
Payer: MEDICARE

## 2019-07-10 VITALS
BODY MASS INDEX: 22.87 KG/M2 | DIASTOLIC BLOOD PRESSURE: 84 MMHG | WEIGHT: 106 LBS | HEIGHT: 57 IN | TEMPERATURE: 98.7 F | OXYGEN SATURATION: 95 % | SYSTOLIC BLOOD PRESSURE: 122 MMHG | RESPIRATION RATE: 18 BRPM | HEART RATE: 70 BPM

## 2019-07-10 DIAGNOSIS — K59.09 CHRONIC CONSTIPATION: ICD-10-CM

## 2019-07-10 DIAGNOSIS — N81.10 VAGINAL PROLAPSE: ICD-10-CM

## 2019-07-10 DIAGNOSIS — R39.198 DIFFICULTY URINATING: ICD-10-CM

## 2019-07-10 DIAGNOSIS — R10.2 PELVIC PRESSURE IN FEMALE: ICD-10-CM

## 2019-07-10 LAB
APPEARANCE UR: CLEAR
BILIRUB UR STRIP-MCNC: NORMAL MG/DL
COLOR UR AUTO: YELLOW
GLUCOSE UR STRIP.AUTO-MCNC: NORMAL MG/DL
KETONES UR STRIP.AUTO-MCNC: NORMAL MG/DL
LEUKOCYTE ESTERASE UR QL STRIP.AUTO: NORMAL
NITRITE UR QL STRIP.AUTO: NORMAL
PH UR STRIP.AUTO: 6.5 [PH] (ref 5–8)
PROT UR QL STRIP: NORMAL MG/DL
RBC UR QL AUTO: NORMAL
SP GR UR STRIP.AUTO: 1.01
UROBILINOGEN UR STRIP-MCNC: 0.2 MG/DL

## 2019-07-10 PROCEDURE — 87086 URINE CULTURE/COLONY COUNT: CPT

## 2019-07-10 PROCEDURE — 81002 URINALYSIS NONAUTO W/O SCOPE: CPT | Performed by: NURSE PRACTITIONER

## 2019-07-10 PROCEDURE — 99203 OFFICE O/P NEW LOW 30 MIN: CPT | Mod: 25 | Performed by: NURSE PRACTITIONER

## 2019-07-10 NOTE — PROGRESS NOTES
Chief Complaint   Patient presents with   • Other     patient is constipated and she can't urinate. she has to move her stomach to be able to urinate.        HISTORY OF PRESENT ILLNESS: Patient is a 83 y.o. female who presents to urgent care today with her daughter, both provide the history. She is here with complaints of pelvic pressure. Notes that for the past several weeks she has noticed pressure between her legs, specifically when she is attempting to urinate. She denies any urinary pain but notes, due to the pressure, she sometimes has difficulty urinating. She also admits to chronic constipation. She has been addressing this with her PCP and takes a daily regimen of metamucil. She denies hematuria, bloody stools, fever, chills, malaise. She otherwise feels well.     Patient Active Problem List    Diagnosis Date Noted   • Low back pain 12/21/2018   • Peptic ulcer 11/26/2018   • Memory loss 11/26/2018   • Anxiety 11/26/2018   • Depression 11/26/2018   • Tremor 11/26/2018       Allergies:Patient has no known allergies.    Current Outpatient Prescriptions Ordered in [facility]   Medication Sig Dispense Refill   • citalopram (CELEXA) 10 MG tablet Take 1 Tab by mouth every day. 90 Tab 0   • Cyanocobalamin (VITAMIN B-12 PO) Take  by mouth every day.     • acetaminophen (TYLENOL) 325 MG Tab Take 650 mg by mouth every four hours as needed.     • Multiple Vitamins-Iron (MULTIVITAMIN/IRON PO) Take  by mouth every day.     • docusate sodium (STOOL SOFTENER) 100 MG Cap Take 100 mg by mouth every day.     • pantoprazole (PROTONIX) 40 MG Pack 40 mg by Nasogastric route every day. 90 Each 3     No current Epic-ordered facility-administered medications on file.        Past Medical History:   Diagnosis Date   • Anxiety    • CHF (congestive heart failure) ([facility])    • Depression    • Memory deficit    • Peptic ulcer    • Tremor        Social History   Substance Use Topics   • Smoking status: Never Smoker   • Smokeless tobacco: Never  "Used   • Alcohol use No      Comment: periodically       Family Status   Relation Status   • Mo    • Fa    • Bro      Family History   Problem Relation Age of Onset   • Heart Disease Mother    • Alzheimer's Disease Father    • Heart Disease Father    • Heart Disease Brother        ROS:  Review of Systems   Constitutional: Negative for fever, chills, weight loss, malaise, and fatigue.   HENT: Negative for ear pain, nosebleeds, congestion, sore throat and neck pain.    Eyes: Negative for vision changes.   Neuro: Negative for headache, sensory changes, weakness, seizure, LOC.   Cardiovascular: Negative for chest pain, palpitations, orthopnea and leg swelling.   Respiratory: Negative for cough, sputum production, shortness of breath and wheezing.   Gastrointestinal: Positive for chronic constipation.  PositiveNegative for abdominal pain, nausea, vomiting or diarrhea.   : Positive for pelvic pressure, occulta urinating.  Negative for hematuria, dysuria, urgency and frequency.  Musculoskeletal: Negative for falls, neck pain, back pain, joint pain, myalgias.   Skin: Negative for rash, diaphoresis.     Exam:  /84 (BP Location: Left arm, Patient Position: Sitting, BP Cuff Size: Adult)   Pulse 70   Temp 37.1 °C (98.7 °F) (Temporal)   Resp 18   Ht 1.448 m (4' 9\")   Wt 48.1 kg (106 lb)   SpO2 95%   General: well-nourished, well-developed female in NAD  Head: normocephalic, atraumatic  Eyes: PERRLA, no conjunctival injection, acuity grossly intact, lids normal.  Ears: normal shape and symmetry, no tenderness, no discharge. External canals are without any significant edema or erythema. Gross auditory acuity is intact.  Nose: symmetrical without tenderness, no discharge.  Mouth/Throat: reasonable hygiene, no erythema, exudates or tonsillar enlargement.  Neck: no masses, range of motion within normal limits, no tracheal deviation. No obvious thyroid enlargement.   Lymph: no cervical adenopathy. " No supraclavicular adenopathy.   Neuro: alert and oriented. Cranial nerves 1-12 grossly intact. No sensory deficit.   Cardiovascular: regular rate and rhythm. No edema.  Pulmonary: no distress. Chest is symmetrical with respiration, no wheezes, crackles, or rhonchi.   Abdomen: soft, non-tender, no guarding, no hepatosplenomegaly.  GYN: External genitalia is normal in appearance.  There is bulging tissue pronounced from vaginal opening, consistent with vaginal prolapse.  The area is nontender, without erythema or irritation.  The tissue easily is redirected into the vaginal canal, patient tolerates well.  Musculoskeletal: no clubbing, appropriate muscle tone, gait is stable.  Skin: warm, dry, intact, no clubbing, no cyanosis, no rashes.   Psych: appropriate mood, affect, judgement.         Assessment/Plan:  1. Pelvic pressure in female  POCT Urinalysis    URINE CULTURE(NEW)    REFERRAL TO UROGYNECOLOGY   2. Difficulty urinating  POCT Urinalysis    URINE CULTURE(NEW)    REFERRAL TO UROGYNECOLOGY   3. Vaginal prolapse  REFERRAL TO UROGYNECOLOGY   4. Chronic constipation  REFERRAL TO GASTROENTEROLOGY       The patient is a pleasant 83-year-old female who presents to clinic today with lower pelvic pressure and difficulty urinating.  Her urine does note some leukocytes and red blood cells in clinic, will send the urine for culture, but do not suspect cystitis at this point.  Suspect her pressure is from vaginal prolapse.  I have placed a referral to urogynecology for her symptoms.  I have also placed a referral to gastroenterology for her chronic constipation and pressure as well.  Supportive care, differential diagnoses, and indications for immediate follow-up discussed with patient.   Pathogenesis of diagnosis discussed including typical length and natural progression.   Instructed to return to clinic or nearest emergency department for any change in condition, further concerns, or worsening of symptoms.  Patient states  understanding of the plan of care and discharge instructions.  Instructed to make an appointment, for follow up, with her primary care provider.        Please note that this dictation was created using voice recognition software. I have made every reasonable attempt to correct obvious errors, but I expect that there are errors of grammar and possibly content that I did not discover before finalizing the note.      HARPREET Banda.

## 2019-07-11 DIAGNOSIS — R10.2 PELVIC PRESSURE IN FEMALE: ICD-10-CM

## 2019-07-11 DIAGNOSIS — R39.198 DIFFICULTY URINATING: ICD-10-CM

## 2019-07-13 ENCOUNTER — TELEPHONE (OUTPATIENT)
Dept: URGENT CARE | Facility: PHYSICIAN GROUP | Age: 84
End: 2019-07-13

## 2019-07-13 LAB
BACTERIA UR CULT: NORMAL
SIGNIFICANT IND 70042: NORMAL
SITE SITE: NORMAL
SOURCE SOURCE: NORMAL

## 2019-07-13 NOTE — TELEPHONE ENCOUNTER
The patient was called for re-evaluation, urine culture negative, a message was left with daughter per patient's request, follow up as directed, encouraged to call back to the clinic or return to clinic with any questions or concerns.       HARPREET Banda.

## 2020-05-14 ENCOUNTER — HOSPITAL ENCOUNTER (OUTPATIENT)
Dept: LAB | Facility: MEDICAL CENTER | Age: 85
End: 2020-05-14
Attending: INTERNAL MEDICINE
Payer: MEDICARE

## 2020-05-14 LAB
ALBUMIN SERPL BCP-MCNC: 4.7 G/DL (ref 3.2–4.9)
ALBUMIN/GLOB SERPL: 1.5 G/DL
ALP SERPL-CCNC: 75 U/L (ref 30–99)
ALT SERPL-CCNC: 48 U/L (ref 2–50)
ANION GAP SERPL CALC-SCNC: 14 MMOL/L (ref 7–16)
AST SERPL-CCNC: 40 U/L (ref 12–45)
BASOPHILS # BLD AUTO: 0.5 % (ref 0–1.8)
BASOPHILS # BLD: 0.04 K/UL (ref 0–0.12)
BILIRUB SERPL-MCNC: 0.5 MG/DL (ref 0.1–1.5)
BUN SERPL-MCNC: 22 MG/DL (ref 8–22)
CALCIUM SERPL-MCNC: 10.3 MG/DL (ref 8.5–10.5)
CHLORIDE SERPL-SCNC: 100 MMOL/L (ref 96–112)
CHOLEST SERPL-MCNC: 225 MG/DL (ref 100–199)
CO2 SERPL-SCNC: 26 MMOL/L (ref 20–33)
CREAT SERPL-MCNC: 0.86 MG/DL (ref 0.5–1.4)
EOSINOPHIL # BLD AUTO: 0.23 K/UL (ref 0–0.51)
EOSINOPHIL NFR BLD: 2.7 % (ref 0–6.9)
ERYTHROCYTE [DISTWIDTH] IN BLOOD BY AUTOMATED COUNT: 55.9 FL (ref 35.9–50)
GLOBULIN SER CALC-MCNC: 3.2 G/DL (ref 1.9–3.5)
GLUCOSE SERPL-MCNC: 82 MG/DL (ref 65–99)
HCT VFR BLD AUTO: 41.5 % (ref 37–47)
HDLC SERPL-MCNC: 54 MG/DL
HGB BLD-MCNC: 13.5 G/DL (ref 12–16)
IMM GRANULOCYTES # BLD AUTO: 0.03 K/UL (ref 0–0.11)
IMM GRANULOCYTES NFR BLD AUTO: 0.4 % (ref 0–0.9)
LDLC SERPL CALC-MCNC: 150 MG/DL
LYMPHOCYTES # BLD AUTO: 3.06 K/UL (ref 1–4.8)
LYMPHOCYTES NFR BLD: 36 % (ref 22–41)
MCH RBC QN AUTO: 35.9 PG (ref 27–33)
MCHC RBC AUTO-ENTMCNC: 32.5 G/DL (ref 33.6–35)
MCV RBC AUTO: 110.4 FL (ref 81.4–97.8)
MONOCYTES # BLD AUTO: 0.63 K/UL (ref 0–0.85)
MONOCYTES NFR BLD AUTO: 7.4 % (ref 0–13.4)
NEUTROPHILS # BLD AUTO: 4.5 K/UL (ref 2–7.15)
NEUTROPHILS NFR BLD: 53 % (ref 44–72)
NRBC # BLD AUTO: 0 K/UL
NRBC BLD-RTO: 0 /100 WBC
PLATELET # BLD AUTO: 284 K/UL (ref 164–446)
PMV BLD AUTO: 9.7 FL (ref 9–12.9)
POTASSIUM SERPL-SCNC: 4.5 MMOL/L (ref 3.6–5.5)
PROT SERPL-MCNC: 7.9 G/DL (ref 6–8.2)
RBC # BLD AUTO: 3.76 M/UL (ref 4.2–5.4)
SODIUM SERPL-SCNC: 140 MMOL/L (ref 135–145)
TRIGL SERPL-MCNC: 107 MG/DL (ref 0–149)
WBC # BLD AUTO: 8.5 K/UL (ref 4.8–10.8)

## 2020-05-14 PROCEDURE — 80053 COMPREHEN METABOLIC PANEL: CPT

## 2020-05-14 PROCEDURE — 80061 LIPID PANEL: CPT | Mod: GA

## 2020-05-14 PROCEDURE — 86780 TREPONEMA PALLIDUM: CPT | Mod: GA

## 2020-05-14 PROCEDURE — 82746 ASSAY OF FOLIC ACID SERUM: CPT

## 2020-05-14 PROCEDURE — 84443 ASSAY THYROID STIM HORMONE: CPT

## 2020-05-14 PROCEDURE — 85025 COMPLETE CBC W/AUTO DIFF WBC: CPT

## 2020-05-14 PROCEDURE — 36415 COLL VENOUS BLD VENIPUNCTURE: CPT

## 2020-05-14 PROCEDURE — 82607 VITAMIN B-12: CPT

## 2020-05-15 LAB
FOLATE SERPL-MCNC: >40 NG/ML
TREPONEMA PALLIDUM IGG+IGM AB [PRESENCE] IN SERUM OR PLASMA BY IMMUNOASSAY: NORMAL
TSH SERPL DL<=0.005 MIU/L-ACNC: 1.47 UIU/ML (ref 0.38–5.33)
VIT B12 SERPL-MCNC: 2536 PG/ML (ref 211–911)

## 2021-01-14 DIAGNOSIS — Z23 NEED FOR VACCINATION: ICD-10-CM

## 2021-04-26 ENCOUNTER — HOSPITAL ENCOUNTER (OUTPATIENT)
Dept: RADIOLOGY | Facility: MEDICAL CENTER | Age: 86
End: 2021-04-26
Attending: INTERNAL MEDICINE
Payer: MEDICARE

## 2021-05-06 ENCOUNTER — APPOINTMENT (OUTPATIENT)
Dept: RADIOLOGY | Facility: MEDICAL CENTER | Age: 86
End: 2021-05-06
Attending: INTERNAL MEDICINE
Payer: MEDICARE